# Patient Record
Sex: FEMALE | Race: AMERICAN INDIAN OR ALASKA NATIVE
[De-identification: names, ages, dates, MRNs, and addresses within clinical notes are randomized per-mention and may not be internally consistent; named-entity substitution may affect disease eponyms.]

---

## 2019-09-06 ENCOUNTER — HOSPITAL ENCOUNTER (EMERGENCY)
Dept: HOSPITAL 5 - ED | Age: 29
Discharge: HOME | End: 2019-09-06
Payer: MEDICAID

## 2019-09-06 VITALS — SYSTOLIC BLOOD PRESSURE: 136 MMHG | DIASTOLIC BLOOD PRESSURE: 78 MMHG

## 2019-09-06 DIAGNOSIS — R22.31: ICD-10-CM

## 2019-09-06 DIAGNOSIS — Z79.899: ICD-10-CM

## 2019-09-06 DIAGNOSIS — M79.601: Primary | ICD-10-CM

## 2019-09-06 PROCEDURE — 96372 THER/PROPH/DIAG INJ SC/IM: CPT

## 2019-09-06 PROCEDURE — 99282 EMERGENCY DEPT VISIT SF MDM: CPT

## 2019-09-06 NOTE — EMERGENCY DEPARTMENT REPORT
Upper Extremity





- HPI


Chief Complaint: Extremity Injury, Upper


Stated Complaint: POSS BLOOD CLOT/(R) ARM PAIN


Time Seen by Provider: 09/06/19 19:41


Upper Extremity: Right Arm (pain and swelling)


Occurred When: >5 Days


Mechanism: Other (recent surgery on arm. )


Severity: moderate


Symptoms: Yes Pain with Movement (pain worst with extension. ), Yes Limited 

Range of Movement, Yes Numbness


Other History: 30 y/o female comes in for right arm pain and swelling with 

fingers that are numb.  Had a venous gram done on her right arm on 8/30/19. LMP 

8/25/19.  Had surgery done Vascular Vein.  Has been taking oxycodone .





ED Review of Systems


ROS: 


Stated complaint: POSS BLOOD CLOT/(R) ARM PAIN


Other details as noted in HPI





Comment: All other systems reviewed and negative


Constitutional: denies: chills, fever


Eyes: denies: eye pain, eye discharge, vision change


ENT: denies: ear pain, throat pain


Respiratory: denies: cough, shortness of breath, wheezing


Cardiovascular: denies: chest pain, palpitations


Musculoskeletal: myalgia





ED Past Medical Hx





- Past Medical History


Previous Medical History?: No


Hx Hypertension: No


Hx Congestive Heart Failure: No


Hx Diabetes: No


Hx Deep Vein Thrombosis: No


Hx Renal Disease: No


Hx Sickle Cell Disease: No


Hx Seizures: No


Hx Asthma: No


Hx COPD: No


Hx HIV: No


Additional medical history: vaginal delivery 5/18/09





- Surgical History


Past Surgical History?: Yes


Additional Surgical History: mass removed from breast, right arm venogram done





- Social History


Smoking Status: Never Smoker


Substance Use Type: None





- Medications


Home Medications: 


                                Home Medications











 Medication  Instructions  Recorded  Confirmed  Last Taken  Type


 


Prenatal Vit/Iron Fum/Folic AC 1 each PO QDAY 01/31/14 05/17/14 05/17/14 History





[Prenatal Vitamin Tablet]    0800 


 


Ferrous Sulfate [Feosol 325 MG tab] 325 mg PO BID #120 tablet 05/19/14  Unknown 

Rx


 


HYDROcodone/APAP 5-325 [Norco 2 each PO Q6H PRN #30 tablet 05/19/14  Unknown Rx





5-325 mg TAB]     


 


Ibuprofen [Motrin 600 MG tab] 600 mg PO Q6H PRN #30 tablet 05/19/14  Unknown Rx


 


Acetaminophen/Codeine [Tylenol #3] 1 tab PO Q6H PRN #15 tab 12/05/15  Unknown Rx


 


Ondansetron [Zofran Odt] 4 mg PO TID #9 tab.rapdis 12/05/15  Unknown Rx


 


metroNIDAZOLE [Flagyl TAB] 500 mg PO Q12HR #14 tab 12/05/15  Unknown Rx














Upper Extremity Exam





- Exam


General: 


Vital signs noted. No distress. Alert and acting appropriately.





Head and Torso: No HEENT Abnormality, No Neck Tenderness, No Chest/Lungs 

Abnormality, No Abdominal Tenderness, No Back Tenderness


Shoulder Exam: Yes Normal Range of Motion in Shoulder, No Shoulder Tenderness, 

No Clavicle Tenderness, No Shoulder Deformity, No AC Joint Tenderness


Arm Exam: Yes Arm/Humerus Tenderness, No Arm Deformity


Elbow: Yes Elbow Tenderness, No Normal Range of Motion in Elbow (pain with 

extension), No Elbow Deformity


Forearm: No Forearm Tenderness, No Forearm Deformity, No Pain with Pronation, No

Pain with Supination


Wrist: Yes Normal ROM in Wrist, No Wrist Tenderness, No Wrist Deformity, No 

Snuffbox Tenderness, No Pain with Axial Thumb Compression


Hand: Yes Normal ROM in Digit(s), No Hand Tenderness, No Hand Deformity, No 

Digit Tenderness, No Digit(s) Deformity, No Tendon Dysfunction


CMS Exam: Yes Normal Capillary Refill





ED Course


                                   Vital Signs











  09/06/19





  19:43


 


Temperature 98.3 F


 


Pulse Rate 65


 


Respiratory 16





Rate 


 


Blood Pressure 140/74


 


O2 Sat by Pulse 99





Oximetry 














ED Medical Decision Making





- Medical Decision Making





30 y/o female comes in for right arm pain and swelling with fingers that are 

numb.  Had a venous gram done on her right arm on 8/30/19. LMP 8/25/19.  Had 

surgery done Vascular Vein.  Has been taking oxycodone.








spoke with Dr. Mar regarding US is not able to do Doppler.  He recommends 

lovenox 1mg per KG time one and have patient come in for Doppler in the morning.







Critical care attestation.: 


If time is entered above; I have spent that time in minutes in the direct care 

of this critically ill patient, excluding procedure time.








ED Disposition


Clinical Impression: 


 Right upper limb pain





Disposition: DC-01 TO HOME OR SELFCARE


Is pt being admited?: No


Does the pt Need Aspirin: No


Condition: Stable


Additional Instructions: 


Please return in the morning to have a US Doppler in the morning at 0900. Follow

up in the ER for any positive results.

## 2019-09-06 NOTE — EVENT NOTE
ED Screening Note


Date of service: 09/06/19


Time: 19:42


ED Screening Note: 





30 y/o female comes in for right arm pain and swelling with fingers that are 

numb.  Had a venous gram done on her right arm on 8/30/19. LMP 8/25/19.  Had 

surgery done Vascular Vein.  Has been taking oxycodone . 





This initial assessment/diagnostic orders/clinical plan/treatment(s) is/are 

subject to change based on patients health status, clinical progression and re-

assessment by fellow clinical providers in the ED. Further treatment and workup 

at subsequent clinical providers discretion. Patient/guardian urged not to elope

from the ED as their condition may be serious if not clinically assessed and 

managed. 





Initial orders include:

## 2019-09-07 ENCOUNTER — HOSPITAL ENCOUNTER (EMERGENCY)
Dept: HOSPITAL 5 - ED | Age: 29
Discharge: HOME | End: 2019-09-07
Payer: MEDICAID

## 2019-09-07 VITALS — DIASTOLIC BLOOD PRESSURE: 73 MMHG | SYSTOLIC BLOOD PRESSURE: 125 MMHG

## 2019-09-07 DIAGNOSIS — Z98.890: ICD-10-CM

## 2019-09-07 DIAGNOSIS — I80.8: Primary | ICD-10-CM

## 2019-09-07 DIAGNOSIS — Z79.899: ICD-10-CM

## 2019-09-07 LAB
ALBUMIN SERPL-MCNC: 4.3 G/DL (ref 3.9–5)
ALT SERPL-CCNC: 14 UNITS/L (ref 7–56)
APTT BLD: 29.7 SEC. (ref 24.2–36.6)
BASOPHILS # (AUTO): 0 K/MM3 (ref 0–0.1)
BASOPHILS NFR BLD AUTO: 0.7 % (ref 0–1.8)
BUN SERPL-MCNC: 10 MG/DL (ref 7–17)
BUN/CREAT SERPL: 20 %
CALCIUM SERPL-MCNC: 9.6 MG/DL (ref 8.4–10.2)
EOSINOPHIL # BLD AUTO: 0 K/MM3 (ref 0–0.4)
EOSINOPHIL NFR BLD AUTO: 0.6 % (ref 0–4.3)
HCT VFR BLD CALC: 41.6 % (ref 30.3–42.9)
HEMOLYSIS INDEX: 3
HGB BLD-MCNC: 14.2 GM/DL (ref 10.1–14.3)
INR PPP: 0.92 (ref 0.87–1.13)
LYMPHOCYTES # BLD AUTO: 3.2 K/MM3 (ref 1.2–5.4)
LYMPHOCYTES NFR BLD AUTO: 54.8 % (ref 13.4–35)
MCHC RBC AUTO-ENTMCNC: 34 % (ref 30–34)
MCV RBC AUTO: 93 FL (ref 79–97)
MONOCYTES # (AUTO): 0.3 K/MM3 (ref 0–0.8)
MONOCYTES % (AUTO): 5.4 % (ref 0–7.3)
PLATELET # BLD: 337 K/MM3 (ref 140–440)
RBC # BLD AUTO: 4.46 M/MM3 (ref 3.65–5.03)

## 2019-09-07 PROCEDURE — 85730 THROMBOPLASTIN TIME PARTIAL: CPT

## 2019-09-07 PROCEDURE — 80053 COMPREHEN METABOLIC PANEL: CPT

## 2019-09-07 PROCEDURE — 93971 EXTREMITY STUDY: CPT

## 2019-09-07 PROCEDURE — 84703 CHORIONIC GONADOTROPIN ASSAY: CPT

## 2019-09-07 PROCEDURE — 96361 HYDRATE IV INFUSION ADD-ON: CPT

## 2019-09-07 PROCEDURE — 96375 TX/PRO/DX INJ NEW DRUG ADDON: CPT

## 2019-09-07 PROCEDURE — 96365 THER/PROPH/DIAG IV INF INIT: CPT

## 2019-09-07 PROCEDURE — 85025 COMPLETE CBC W/AUTO DIFF WBC: CPT

## 2019-09-07 PROCEDURE — 99284 EMERGENCY DEPT VISIT MOD MDM: CPT

## 2019-09-07 PROCEDURE — 85610 PROTHROMBIN TIME: CPT

## 2019-09-07 PROCEDURE — 36415 COLL VENOUS BLD VENIPUNCTURE: CPT

## 2019-09-07 NOTE — EMERGENCY DEPARTMENT REPORT
Upper Extremity





- HPI


Chief Complaint: Extremity Problem,Nontraumatic


Stated Complaint: FOLLOW UP FROM A BLOOD CLOT/SHOT


Time Seen by Provider: 19 08:46


Upper Extremity: Right Arm (red, swollen, painful radiation of pain distally and

bruising to right upper inner arm), Right Elbow (Painful), Right Forearm 

(painful)


Occurred When: >5 Days (10 days)


Mechanism: Other (postprocedure)


Severity: severe


Symptoms: Yes Pain with Movement (right upper and lower arm), Yes Limited Range 

of Movement (difficulty in moving arm and forearm and bended elbow due to pain),

Yes Weakness (upper arm), Yes Swelling (upper arm extending down to lower arm), 

Yes Bruising/Ecchymosis (upper arm), No Deformity, No Numbness, No Laceration or

Abrasion


Other History: This is a 29-year-old female who had venogram done on 2019 

at this [facility] vascular group by Dr. Talbert.  He reported a procedure was done 

because she was having severe pelvic pain and they were not able to find out 

what was going on when she was having pain so he did a venogram to look to her 

blood vessels and found that she has abnormal vessel in her left ovary which she

said was corrected.  She reported after surgery she noted pain and swelling the 

next day that has been worsening since.  She said Dr. Talbert at told her that she 

should return if she is having any problem but she called and the group told her

that he is on vacation so she came to the emergency room.  She reported that she

saw Dr. Brian again on 2019 and he evaluated her arm and gave her Keflex 

antibiotic but pain and swelling is getting worse and worse then in movement of 

her elbow and arm.  She reports that swelling and pain is extending into her 

forearm.  Patient was seen by provider yesterday at [facility] facility in ED  and was 

given Lovenox and told to return today for Doppler ultrasound .  She denies any 

numbness or tingling in her fingers.  Denies any difficulty in moving fingers.  

Pain 10/10 worse with movement and reported that she cannot turn her arm over 

due to increased pain and she has to use a blanket to hold her arm up.  She says

she has been taking Keflex for the last 5 days and her symptoms are worsening.  

Denies any fever but reports chills.  She reports that she is getting some 

redness to the area.





ED Review of Systems


ROS: 


Stated complaint: FOLLOW UP FROM A BLOOD CLOT/SHOT


Other details as noted in HPI





Constitutional: chills, weakness.  denies: fever


ENT: denies: throat pain, congestion


Respiratory: denies: cough, orthopnea, shortness of breath, SOB with exertion, 

SOB at rest, stridor, wheezing


Cardiovascular: denies: chest pain, palpitations, dyspnea on exertion, edema, 

syncope, paroxysmal nocturnal dyspnea


Gastrointestinal: denies: abdominal pain, nausea, vomiting


Musculoskeletal: joint swelling, arthralgia, myalgia.  denies: back pain


Skin: other (present)


Neurological: weakness.  denies: headache, numbness, paresthesias, abnormal 

gait, vertigo





ED Past Medical Hx





- Past Medical History


Previous Medical History?: No


Hx Hypertension: No


Hx Congestive Heart Failure: No


Hx Diabetes: No


Hx Deep Vein Thrombosis: No


Hx Renal Disease: No


Hx Sickle Cell Disease: No


Hx Seizures: No


Hx Asthma: No


Hx COPD: No


Hx HIV: No


Additional medical history: vaginal delivery 09





- Surgical History


Past Surgical History?: Yes


Additional Surgical History: mass removed from breast, right arm venogram done





- Family History


Family history: hypertension





- Social History


Smoking Status: Never Smoker


Substance Use Type: None





- Medications


Home Medications: 


                                Home Medications











 Medication  Instructions  Recorded  Confirmed  Last Taken  Type


 


Prenatal Vit/Iron Fum/Folic AC 1 each PO QDAY 14 History





[Prenatal Vitamin Tablet]    0800 


 


Ferrous Sulfate [Feosol 325 MG tab] 325 mg PO BID #120 tablet 14  Unknown 

Rx


 


HYDROcodone/APAP 5-325 [Norco 2 each PO Q6H PRN #30 tablet 14  Unknown Rx





5-325 mg TAB]     


 


Ibuprofen [Motrin 600 MG tab] 600 mg PO Q6H PRN #30 tablet 14  Unknown Rx


 


Ondansetron [Zofran Odt] 4 mg PO TID #9 tab.rapdis 12/05/15  Unknown Rx


 


metroNIDAZOLE [Flagyl TAB] 500 mg PO Q12HR #14 tab 12/05/15  Unknown Rx


 


Acetaminophen/Codeine [Tylenol 1 tab PO Q6H PRN #12 tab 19  Unknown Rx





/Codeine # 3 tab]     


 


Clindamycin [Clindamycin CAP] 300 mg PO Q8H 10 Days #30 cap 19  Unknown Rx


 


Ibuprofen [Motrin] 800 mg PO Q8HR 7 Days #21 tablet 19  Unknown Rx














Upper Extremity Exam





- Exam


General: 


Vital signs noted. No distress. Alert and acting appropriately.


This is a 29-year-old female well-nourished well-developed in mild distress from

 pain.


Head and Torso: No HEENT Abnormality, No Neck Tenderness, No Chest/Lungs 

Abnormality, No Abdominal Tenderness, No Back Tenderness


Shoulder Exam: Yes Normal Range of Motion in Shoulder (Limited range of motion 

due to pain from right arm), No Shoulder Tenderness (  Nontender to palpate), No

 Clavicle Tenderness, No Shoulder Deformity, No AC Joint Tenderness


Arm Exam: Yes Arm/Humerus Tenderness (right arm with swelling, ecchymosis and 

tenderness to palpate medial to proximal, lateral), No Arm Deformity


Elbow: Yes Normal Range of Motion in Elbow (Limited range of motion due to 

pain), No Elbow Tenderness, No Elbow Deformity


Forearm: Yes Forearm Tenderness (minimal tenderness to palpation), Yes Forearm 

Deformity, Yes Pain with Pronation, Yes Pain with Supination


Wrist: Yes Normal ROM in Wrist, No Wrist Tenderness, No Wrist Deformity, No 

Snuffbox Tenderness, No Pain with Axial Thumb Compression


Hand: No Hand Tenderness, No Hand Deformity, No Digit Tenderness, No Normal ROM 

in Digit(s), No Digit(s) Deformity, No Tendon Dysfunction


CMS Exam: Yes Normal Distal Pulses (2+ radial and ulnar bilaterally), Yes Normal

 Capillary Refill (less than 3 seconds), Yes Normal Distal Sensation (all 

sensation), No Broken Skin


Front/Back of Body, Lg (Color): 


                            __________________________














                            __________________________





 1 - Redness, swelling, ecchymosis right medial proximal inner arm.  Tenderness 

to palpate








ED Course


                                   Vital Signs











  19





  08:19


 


Temperature 98.5 F


 


Pulse Rate 79


 


Respiratory 16





Rate 


 


Blood Pressure 135/80


 


O2 Sat by Pulse 100





Oximetry 








                                   Vital Signs











  19





  08:19


 


Temperature 98.5 F


 


Pulse Rate 79


 


Respiratory 16





Rate 


 


Blood Pressure 135/80


 


O2 Sat by Pulse 100





Oximetry 














- Reevaluation(s)


Reevaluation #1: 





19 10:52


Patient found to have no DVT to her right upper extremity but she does have SVT 

as interpreted by radiologist thrombus is demonstrated in the basilic vein which

 is superficial vein.  Labs ordered.  Patient was informed of ultrasound 

results.  Patient reported that she was in a lot of pain and feeling weak and 

therefore it was decided after speaking with Dr. Melo that she will get 1 L of 

normal saline, IV clindamycin and labs which were ordered.  Morphine and Zofran 

ordered to manage pain.  Patient stable at present her vital signs are stable 

she is afebrile.  I called patient doctor who did her procedure was Dr. Talbert 

answering service and I am awaiting call back.


Reevaluation #2: 





19 11:15


I spoke with RN at this [facility] facility and she advised me that she will be 

talking to another physician at facility and she will be given back in touch 

with me regarding patient.  Gave her detailed information on patient 

presentation to the emergency room on 2019 and 9019 and physical fi

ndings along with ultrasound findings.  She is started on IV fluid, given pain 

medication and nausea medication.  Vital signs are stable and pain is subsiding 

status post morphine 4 mg IV.  She is also given Toradol 30 mg IV and emergency 

room.














Reevaluation #3: 





19 13:06


I spoke with Melissa at this preferred vascular group and she informed me that 

patient after reviewing her chart did have ultrasound on 2019 which did 

not show any clots and the protocol is after still having problems then they 

need to present to the emergency room for treatment.  She also reports that 

patient has appointment set up for 2019 but she will call patient today to

 get patient seen on 2019.  I discussed this with the patient in detail.  

She had no adverse reaction from her medication and her pain is better.


19 13:50








- Orthopedic Splinting/Casting


  ** Injury #1


Side: right


Upper Extremity Injury Location: upper arm


Upper Extremity Immobilizer: sling/shoulder immobilize





ED Medical Decision Making





- Lab Data


Result diagrams: 


                                 19 10:38





                                 19 10:38





                                   Lab Results











  19 Range/Units





  10:38 10:38 10:38 


 


WBC  5.8    (4.5-11.0)  K/mm3


 


RBC  4.46    (3.65-5.03)  M/mm3


 


Hgb  14.2    (10.1-14.3)  gm/dl


 


Hct  41.6    (30.3-42.9)  %


 


MCV  93    (79-97)  fl


 


MCH  32    (28-32)  pg


 


MCHC  34    (30-34)  %


 


RDW  13.4    (13.2-15.2)  %


 


Plt Count  337    (140-440)  K/mm3


 


Lymph % (Auto)  54.8 H    (13.4-35.0)  %


 


Mono % (Auto)  5.4    (0.0-7.3)  %


 


Eos % (Auto)  0.6    (0.0-4.3)  %


 


Baso % (Auto)  0.7    (0.0-1.8)  %


 


Lymph #  3.2    (1.2-5.4)  K/mm3


 


Mono #  0.3    (0.0-0.8)  K/mm3


 


Eos #  0.0    (0.0-0.4)  K/mm3


 


Baso #  0.0    (0.0-0.1)  K/mm3


 


Seg Neutrophils %  38.5 L    (40.0-70.0)  %


 


Seg Neutrophils #  2.2    (1.8-7.7)  K/mm3


 


PT   12.1 L   (12.2-14.9)  Sec.


 


INR   0.92   (0.87-1.13)  


 


APTT   29.7   (24.2-36.6)  Sec.


 


Sodium    141  (137-145)  mmol/L


 


Potassium    4.1  (3.6-5.0)  mmol/L


 


Chloride    101.7  ()  mmol/L


 


Carbon Dioxide    27  (22-30)  mmol/L


 


Anion Gap    16  mmol/L


 


BUN    10  (7-17)  mg/dL


 


Creatinine    0.5 L  (0.7-1.2)  mg/dL


 


Estimated GFR    > 60  ml/min


 


BUN/Creatinine Ratio    20  %


 


Glucose    89  ()  mg/dL


 


Calcium    9.6  (8.4-10.2)  mg/dL


 


Total Bilirubin    0.40  (0.1-1.2)  mg/dL


 


AST    15  (5-40)  units/L


 


ALT    14  (7-56)  units/L


 


Alkaline Phosphatase    61  ()  units/L


 


Total Protein    7.9  (6.3-8.2)  g/dL


 


Albumin    4.3  (3.9-5)  g/dL


 


Albumin/Globulin Ratio    1.2  %


 


HCG, Qual     (Negative)  














  19 Range/Units





  10:38 


 


WBC   (4.5-11.0)  K/mm3


 


RBC   (3.65-5.03)  M/mm3


 


Hgb   (10.1-14.3)  gm/dl


 


Hct   (30.3-42.9)  %


 


MCV   (79-97)  fl


 


MCH   (28-32)  pg


 


MCHC   (30-34)  %


 


RDW   (13.2-15.2)  %


 


Plt Count   (140-440)  K/mm3


 


Lymph % (Auto)   (13.4-35.0)  %


 


Mono % (Auto)   (0.0-7.3)  %


 


Eos % (Auto)   (0.0-4.3)  %


 


Baso % (Auto)   (0.0-1.8)  %


 


Lymph #   (1.2-5.4)  K/mm3


 


Mono #   (0.0-0.8)  K/mm3


 


Eos #   (0.0-0.4)  K/mm3


 


Baso #   (0.0-0.1)  K/mm3


 


Seg Neutrophils %   (40.0-70.0)  %


 


Seg Neutrophils #   (1.8-7.7)  K/mm3


 


PT   (12.2-14.9)  Sec.


 


INR   (0.87-1.13)  


 


APTT   (24.2-36.6)  Sec.


 


Sodium   (137-145)  mmol/L


 


Potassium   (3.6-5.0)  mmol/L


 


Chloride   ()  mmol/L


 


Carbon Dioxide   (22-30)  mmol/L


 


Anion Gap   mmol/L


 


BUN   (7-17)  mg/dL


 


Creatinine   (0.7-1.2)  mg/dL


 


Estimated GFR   ml/min


 


BUN/Creatinine Ratio   %


 


Glucose   ()  mg/dL


 


Calcium   (8.4-10.2)  mg/dL


 


Total Bilirubin   (0.1-1.2)  mg/dL


 


AST   (5-40)  units/L


 


ALT   (7-56)  units/L


 


Alkaline Phosphatase   ()  units/L


 


Total Protein   (6.3-8.2)  g/dL


 


Albumin   (3.9-5)  g/dL


 


Albumin/Globulin Ratio   %


 


HCG, Qual  Negative  (Negative)  














- Radiology Data


Radiology results: report reviewed


Patient had Doppler ultrasound right upper extremity which shows no DVT but 

positive SVT and basilic vein.  This was dictated by radiologist's and report 

reviewed by myself.


Findings


[facility]


[address] [place] [address]





Vascular Lab Report


Signed





Patient: JOSE FRANCISCO STRONG MR#:


[id number]


: 1990 Acct:[id number]





Age/Sex: 29 / F ADM Date: 19





Loc: ED


Attending Dr:








Ordering Physician: PAUL CANALES MD


Date of Service: 19


Procedure(s): VL venous duplex UE RT


Accession Number(s): [id number]





cc: PAUL CANALES MD








VL venous duplex UE RT





INDICATION / CLINICAL INFORMATION:


Swelling/pain RUE following venogram.





COMPARISON:


None available.





FINDINGS:


No evidence of deep vein thrombosis.





Thrombus is demonstrated in the basilic vein, which is a superficial vein.





IMPRESSION:


1. Negative for DVT.





Signer Name: Kirk Campos MD


Signed: 2019 9:42 AM


Workstation Name: [id number]








Transcribed By: TM


Dictated By: Kirk Campos MD


Electronically Authenticated By: Kirk Campos MD


Signed Date/Time: 19











DD/DT: 19


TD/TT:





- Medical Decision Making





This is a 29-year-old female who had a venogram on  and had postprocedure 

swelling to right upper extremity.  She was seen by Dr. Brian at [facility] 

vascular group who did procedure.  He saw her on 2019 for follow-up visit 

and on 2019 and did ultrasound right upper extremity which was negative 

per clinic staff.  This is verified by patient.  She presented yesterday to 

emergency room with complaint of worsening swelling to the right upper extremity

 this radiating down her right arm and was given Lovenox and told to return for 

ultrasound.  Ultrasound showed SVT in her basilic vein and this was verified by 

radiologist and reviewed by myself.  Lab reviewed and are stable.  I discussed 

this in detail with patient and she will be contacted by the vascular group 

today to schedule an appointment for Monday evening.  He has an appointment for 

2019.  Patient was given pain medication, IV fluid and antibiotic 

emergency room.  I explained to her diagnosis to her and treatment plan and she 

voiced understanding.  Her vital signs are stable she is afebrile and discharged

 home in stable condition with prescription for Motrin, clindamycin and Tylenol 

No. 3 and to follow-up with [facility] vascular group Dr. Brian on 2019 or 

for condition worsens to return to the emergency room.  Patient with sling to 

right upper extremity








- Differential Diagnosis


DVT versus cellulitis


Critical care attestation.: 


If time is entered above; I have spent that time in minutes in the direct care 

of this critically ill patient, excluding procedure time.








ED Disposition


Clinical Impression: 


 Superficial thrombophlebitis of right upper extremity, Pain and swelling of 

right upper extremity





Disposition: DC- TO HOME OR SELFCARE


Is pt being admited?: No


Does the pt Need Aspirin: No


Condition: Stable


Instructions:  Superficial Thrombophlebitis (ED), Musculoskeletal Pain (ED)


Additional Instructions: 


Please follow-up with your vascular doctor at preferred vascular group on 

2019.  Expect a call from the group this afternoon to confirm an 

appointment.  If you condition worsens he needs to return to the emergency room 

otherwise take medication as prescribed.


He is apply warm compresses to affected area 2 right upper extremity 3-4 times a

 day to reduce pain and swelling.  Take Motrin as prescribed and this will help 

with pain swelling and with superficial blood clot in your right upper 

extremity.  These take Motrin with food as this medication to cause irritation 

to stomach lining


Prescriptions: 


Clindamycin [Clindamycin CAP] 300 mg PO Q8H 10 Days #30 cap


Ibuprofen [Motrin] 800 mg PO Q8HR 7 Days #21 tablet


Acetaminophen/Codeine [Tylenol /Codeine # 3 tab] 1 tab PO Q6H PRN #12 tab


 PRN Reason: moderate to severe pain


Referrals: 


PRIMARY CARE,MD [Primary Care Provider] - 19 (Preferred vascular group 

where you had your procedure done.)


Forms:  Accompanied Note, Work/School Release Form(ED)

## 2019-09-07 NOTE — VASCULAR LAB REPORT
VL venous duplex UE RT



INDICATION / CLINICAL INFORMATION:

Swelling/pain RUE following venogram.



COMPARISON:

None available.



FINDINGS:

No evidence of deep vein thrombosis.



Thrombus is demonstrated in the basilic vein, which is a superficial vein.



IMPRESSION:

1. Negative for DVT. 



Signer Name: Kirk Campos MD 

Signed: 9/7/2019 9:42 AM

 Workstation Name: [id number]

## 2019-10-09 ENCOUNTER — HOSPITAL ENCOUNTER (EMERGENCY)
Dept: HOSPITAL 5 - ED | Age: 29
Discharge: HOME | End: 2019-10-09
Payer: MEDICAID

## 2019-10-09 VITALS — DIASTOLIC BLOOD PRESSURE: 81 MMHG | SYSTOLIC BLOOD PRESSURE: 117 MMHG

## 2019-10-09 DIAGNOSIS — Z79.899: ICD-10-CM

## 2019-10-09 DIAGNOSIS — R10.13: Primary | ICD-10-CM

## 2019-10-09 LAB
ALBUMIN SERPL-MCNC: 4.1 G/DL (ref 3.9–5)
ALT SERPL-CCNC: 17 UNITS/L (ref 7–56)
BACTERIA #/AREA URNS HPF: (no result) /HPF
BAND NEUTROPHILS # (MANUAL): 0 K/MM3
BILIRUB UR QL STRIP: (no result)
BLOOD UR QL VISUAL: (no result)
BUN SERPL-MCNC: 9 MG/DL (ref 7–17)
BUN/CREAT SERPL: 15 %
CALCIUM SERPL-MCNC: 8.7 MG/DL (ref 8.4–10.2)
HCT VFR BLD CALC: 38.4 % (ref 30.3–42.9)
HEMOLYSIS INDEX: 9
HGB BLD-MCNC: 13 GM/DL (ref 10.1–14.3)
MCHC RBC AUTO-ENTMCNC: 34 % (ref 30–34)
MCV RBC AUTO: 93 FL (ref 79–97)
MUCOUS THREADS #/AREA URNS HPF: (no result) /HPF
MYELOCYTES # (MANUAL): 0 K/MM3
PH UR STRIP: 6 [PH] (ref 5–7)
PLATELET # BLD: 301 K/MM3 (ref 140–440)
PROMYELOCYTES # (MANUAL): 0 K/MM3
PROT UR STRIP-MCNC: (no result) MG/DL
RBC # BLD AUTO: 4.12 M/MM3 (ref 3.65–5.03)
RBC #/AREA URNS HPF: 1 /HPF (ref 0–6)
TOTAL CELLS COUNTED BLD: 100
UROBILINOGEN UR-MCNC: < 2 MG/DL (ref ?–2)
WBC #/AREA URNS HPF: 1 /HPF (ref 0–6)

## 2019-10-09 PROCEDURE — 83690 ASSAY OF LIPASE: CPT

## 2019-10-09 PROCEDURE — 84703 CHORIONIC GONADOTROPIN ASSAY: CPT

## 2019-10-09 PROCEDURE — 74022 RADEX COMPL AQT ABD SERIES: CPT

## 2019-10-09 PROCEDURE — 80053 COMPREHEN METABOLIC PANEL: CPT

## 2019-10-09 PROCEDURE — 82150 ASSAY OF AMYLASE: CPT

## 2019-10-09 PROCEDURE — 99284 EMERGENCY DEPT VISIT MOD MDM: CPT

## 2019-10-09 PROCEDURE — 81001 URINALYSIS AUTO W/SCOPE: CPT

## 2019-10-09 PROCEDURE — 85025 COMPLETE CBC W/AUTO DIFF WBC: CPT

## 2019-10-09 PROCEDURE — 36415 COLL VENOUS BLD VENIPUNCTURE: CPT

## 2019-10-09 PROCEDURE — 81025 URINE PREGNANCY TEST: CPT

## 2019-10-09 PROCEDURE — 85007 BL SMEAR W/DIFF WBC COUNT: CPT

## 2019-10-09 NOTE — XRAY REPORT
ABDOMEN 2 VIEW(S) WITH PA CHEST



INDICATION / CLINICAL INFORMATION:

EPIGASTRIC PAIN.



COMPARISON: 

CT of the abdomen and pelvis dated 12/4/2015.



FINDINGS:

CHEST X-RAY: No acute cardiopulmonary abnormality.



TUBES / LINES: Coil material present in the left side of the abdomen, possibly from previous ovarian 
vein coiling.

BOWEL GAS PATTERN/EXTRALUMINAL GAS: No significant abnormality. No pneumatosis or secondary signs of 
free air.



ADDITIONAL FINDINGS: No significant additional findings.



IMPRESSION:

1. No acute findings.



Signer Name: Karl Mccray MD 

Signed: 10/9/2019 5:08 PM

 Workstation Name: [id number]

## 2019-10-09 NOTE — EVENT NOTE
ED Screening Note


Date of service: 10/09/19


Time: 13:53


ED Screening Note: 


28 y/o female comes in for sharp abdominal pain that started 3 days ago.  LMP 

9/27/19. No vaginal D/D or Vag bleeding.  Pain 9/10. Took Tylenol 3 at 9am.  





This initial assessment/diagnostic orders/clinical plan/treatment(s) is/are 

subject to change based on patients health status, clinical progression and re-

assessment by fellow clinical providers in the ED. Further treatment and workup 

at subsequent clinical providers discretion. Patient/guardian urged not to elope

from the ED as their condition may be serious if not clinically assessed and 

managed. 





Initial orders include:

## 2019-10-09 NOTE — EMERGENCY DEPARTMENT REPORT
ED Abdominal Pain HPI





- General


Chief Complaint: Abdominal Pain


Stated Complaint: ABD PAIN


Time Seen by Provider: 10/09/19 13:53


Source: patient


Mode of arrival: Ambulatory


Limitations: No Limitations





- History of Present Illness


Initial Comments: 





28 yo come to er with sharp epigastric pain. NO n/v/d. Taking PO. ambulatory and

non ill appearing on exam. 





has taken no meds nor seen pcp pta in ER. 








MD Complaint: abdominal pain


-: hour(s)


Location: epigastric


Radiation: none


Migration to: no migration


Severity: severe


Quality: sharp


Consistency: intermittent


Improves With: nothing


Worsens With: nothing


Associated Symptoms: denies other symptoms





- Related Data


                                  Previous Rx's











 Medication  Instructions  Recorded  Last Taken  Type


 


Pantoprazole [Protonix TAB] 20 mg PO QDAY #30 tablet. 10/09/19 Unknown Rx











                                    Allergies











Allergy/AdvReac Type Severity Reaction Status Date / Time


 


No Known Allergies Allergy   Verified 05/21/14 18:33














ED Review of Systems


ROS: 


Stated complaint: ABD PAIN


Other details as noted in HPI





Comment: All other systems reviewed and negative





ED Past Medical Hx





- Past Medical History


Previous Medical History?: Yes


Hx Hypertension: No


Hx Congestive Heart Failure: No


Hx Diabetes: No


Hx Deep Vein Thrombosis: No


Hx Renal Disease: No


Hx Sickle Cell Disease: No


Hx Seizures: No


Hx Asthma: No


Hx COPD: No


Hx HIV: No


Additional medical history: vaginal delivery 5/18/09; SP OVARIAN VEIN COIL





- Surgical History


Past Surgical History?: Yes


Additional Surgical History: mass removed from breast, right arm venogram done





- Family History


Family history: no significant





- Social History


Smoking Status: Never Smoker


Substance Use Type: None





- Medications


Home Medications: 


                                Home Medications











 Medication  Instructions  Recorded  Confirmed  Last Taken  Type


 


Pantoprazole [Protonix TAB] 20 mg PO QDAY #30 tablet. 10/09/19  Unknown Rx














ED Physical Exam





- General


Limitations: No Limitations


General appearance: alert





- Head


Head exam: Present: normocephalic





- Eye


Eye exam: Present: PERRL





- ENT


ENT exam: Present: mucous membranes moist





- Neck


Neck exam: Present: normal inspection





- Respiratory


Respiratory exam: Present: normal lung sounds bilaterally





- Cardiovascular


Cardiovascular Exam: Present: regular rate





- GI/Abdominal


GI/Abdominal exam: Present: soft, normal bowel sounds





- Rectal


Rectal exam: Present: deferred





- Extremities Exam


Extremities exam: Present: normal inspection





- Back Exam


Back exam: Present: normal inspection





- Neurological Exam


Neurological exam: Present: alert, oriented X3





- Psychiatric


Psychiatric exam: Present: normal affect, normal mood





- Skin


Skin exam: Present: warm, dry, intact





ED Course


                                   Vital Signs











  10/09/19 10/09/19





  13:20 17:45


 


Temperature 98.7 F 98.2 F


 


Pulse Rate 103 H 68


 


Respiratory 16 18





Rate  


 


Blood Pressure 114/62 


 


Blood Pressure  117/81





[Left]  


 


O2 Sat by Pulse 100 100





Oximetry  














ED Medical Decision Making





- Lab Data


Result diagrams: 


                                 10/09/19 14:10





                                 10/09/19 14:10





- Radiology Data


Radiology results: report reviewed, image reviewed





- Medical Decision Making








Labs











  10/09/19 10/09/19 10/09/19





  14:10 14:10 14:10


 


WBC  6.2  


 


RBC  4.12  


 


Hgb  13.0  


 


Hct  38.4  


 


MCV  93  


 


MCH  32  


 


MCHC  34  


 


RDW  13.1 L  


 


Plt Count  301  


 


Lymph % (Auto)  Np  


 


Seg Neutrophils %  Np  


 


Sodium   138 


 


Potassium   3.9 


 


Chloride   102.7 


 


Carbon Dioxide   28 


 


Anion Gap   11 


 


BUN   9 


 


Creatinine   0.6 L 


 


Estimated GFR   > 60 


 


BUN/Creatinine Ratio   15 


 


Glucose   84 


 


Calcium   8.7 


 


Total Bilirubin   0.50 


 


AST   16 


 


ALT   17 


 


Alkaline Phosphatase   51 


 


Total Protein   6.9 


 


Albumin   4.1 


 


Albumin/Globulin Ratio   1.5 


 


Lipase   18 


 


HCG, Qual    Negative


 


Urine Color   


 


Urine Turbidity   


 


Urine pH   


 


Ur Specific Gravity   


 


Urine Protein   


 


Urine Glucose (UA)   


 


Urine Ketones   


 


Urine Blood   


 


Urine Nitrite   


 


Urine Bilirubin   


 


Urine Urobilinogen   


 


Ur Leukocyte Esterase   


 


Urine WBC (Auto)   


 


Urine RBC (Auto)   


 


U Epithel Cells (Auto)   


 


Urine Bacteria (Auto)   


 


Urine Mucus   


 


Urine HCG, Qual   














  10/09/19





  15:59


 


WBC 


 


RBC 


 


Hgb 


 


Hct 


 


MCV 


 


MCH 


 


MCHC 


 


RDW 


 


Plt Count 


 


Lymph % (Auto) 


 


Seg Neutrophils % 


 


Sodium 


 


Potassium 


 


Chloride 


 


Carbon Dioxide 


 


Anion Gap 


 


BUN 


 


Creatinine 


 


Estimated GFR 


 


BUN/Creatinine Ratio 


 


Glucose 


 


Calcium 


 


Total Bilirubin 


 


AST 


 


ALT 


 


Alkaline Phosphatase 


 


Total Protein 


 


Albumin 


 


Albumin/Globulin Ratio 


 


Lipase 


 


HCG, Qual 


 


Urine Color  Yellow


 


Urine Turbidity  Clear


 


Urine pH  6.0


 


Ur Specific Gravity  1.026


 


Urine Protein  <15 mg/dl


 


Urine Glucose (UA)  Neg


 


Urine Ketones  Neg


 


Urine Blood  Neg


 


Urine Nitrite  Neg


 


Urine Bilirubin  Neg


 


Urine Urobilinogen  < 2.0


 


Ur Leukocyte Esterase  Tr


 


Urine WBC (Auto)  1.0


 


Urine RBC (Auto)  1.0


 


U Epithel Cells (Auto)  2.0


 


Urine Bacteria (Auto)  1+


 


Urine Mucus  2+


 


Urine HCG, Qual  Negative








labs noted


ua noted


hcg neg


no free air on xray





medicated in ER with relief of pain





taking po


ambulatory





                                   Vital Signs











  10/09/19 10/09/19





  13:20 17:45


 


Temperature 98.7 F 98.2 F


 


Pulse Rate 103 H 68


 


Respiratory 16 18





Rate  


 


Blood Pressure 114/62 


 


Blood Pressure  117/81





[Left]  


 


O2 Sat by Pulse 100 100





Oximetry  











dc home with pcp and GI follow up 





Critical care attestation.: 


If time is entered above; I have spent that time in minutes in the direct care 

of this critically ill patient, excluding procedure time.








ED Disposition


Clinical Impression: 


 Epigastric pain





Disposition: DC-01 TO HOME OR SELFCARE


Is pt being admited?: No


Does the pt Need Aspirin: No


Condition: Stable


Instructions:  Abdominal Pain (ED)


Additional Instructions: 


BLAND DIET





HYDRATE WELL WITH WATER





MED AS ORDERED TODAY





ALL LABS NORMAL TODAY





FOLLOW UP WITH GI MD


REFERRAL BELOW








Prescriptions: 


Pantoprazole [Protonix TAB] 20 mg PO QDAY #30 tablet.


Referrals: 


PRIMARY CARE,MD [Primary Care Provider] - 3-5 Days


AMADOR BLACKBURN MD [Staff Physician] - 3-5 Days


Forms:  Work/School Release Form(ED)


Time of Disposition: 17:25

## 2019-11-12 ENCOUNTER — HOSPITAL ENCOUNTER (EMERGENCY)
Dept: HOSPITAL 5 - ED | Age: 29
Discharge: LEFT BEFORE DISCHARGE | End: 2019-11-12
Payer: MEDICAID

## 2019-11-12 DIAGNOSIS — M54.9: Primary | ICD-10-CM

## 2019-11-12 DIAGNOSIS — Z53.21: ICD-10-CM
